# Patient Record
Sex: MALE | Race: WHITE | NOT HISPANIC OR LATINO | Employment: OTHER | URBAN - METROPOLITAN AREA
[De-identification: names, ages, dates, MRNs, and addresses within clinical notes are randomized per-mention and may not be internally consistent; named-entity substitution may affect disease eponyms.]

---

## 2019-07-23 ENCOUNTER — APPOINTMENT (OUTPATIENT)
Dept: GENERAL RADIOLOGY | Facility: HOSPITAL | Age: 34
End: 2019-07-23

## 2019-07-23 ENCOUNTER — HOSPITAL ENCOUNTER (EMERGENCY)
Facility: HOSPITAL | Age: 34
Discharge: HOME | End: 2019-07-23
Attending: EMERGENCY MEDICINE | Admitting: EMERGENCY MEDICINE

## 2019-07-23 ENCOUNTER — APPOINTMENT (OUTPATIENT)
Dept: CT IMAGING | Facility: HOSPITAL | Age: 34
End: 2019-07-23

## 2019-07-23 VITALS
SYSTOLIC BLOOD PRESSURE: 141 MMHG | RESPIRATION RATE: 20 BRPM | HEART RATE: 47 BPM | DIASTOLIC BLOOD PRESSURE: 88 MMHG | WEIGHT: 170 LBS | HEIGHT: 70 IN | OXYGEN SATURATION: 98 % | TEMPERATURE: 98.5 F | BODY MASS INDEX: 24.34 KG/M2

## 2019-07-23 DIAGNOSIS — S06.0X0A CONCUSSION WITHOUT LOSS OF CONSCIOUSNESS, INITIAL ENCOUNTER: Primary | ICD-10-CM

## 2019-07-23 DIAGNOSIS — S20.219A CONTUSION OF CHEST WALL, UNSPECIFIED LATERALITY, INITIAL ENCOUNTER: ICD-10-CM

## 2019-07-23 DIAGNOSIS — S09.90XA MINOR HEAD INJURY, INITIAL ENCOUNTER: ICD-10-CM

## 2019-07-23 PROCEDURE — 72125 CT NECK SPINE W/O DYE: CPT

## 2019-07-23 PROCEDURE — 93005 ELECTROCARDIOGRAM TRACING: CPT

## 2019-07-23 PROCEDURE — 99284 EMERGENCY DEPT VISIT MOD MDM: CPT

## 2019-07-23 PROCEDURE — 70450 CT HEAD/BRAIN W/O DYE: CPT

## 2019-07-23 PROCEDURE — 93005 ELECTROCARDIOGRAM TRACING: CPT | Performed by: EMERGENCY MEDICINE

## 2019-07-23 PROCEDURE — 71045 X-RAY EXAM CHEST 1 VIEW: CPT

## 2019-07-23 RX ORDER — HYDROCODONE BITARTRATE AND ACETAMINOPHEN 5; 325 MG/1; MG/1
1 TABLET ORAL ONCE
Status: COMPLETED | OUTPATIENT
Start: 2019-07-23 | End: 2019-07-23

## 2019-07-23 RX ADMIN — HYDROCODONE BITARTRATE AND ACETAMINOPHEN 1 TABLET: 5; 325 TABLET ORAL at 17:33

## 2019-12-23 ENCOUNTER — OFFICE VISIT (OUTPATIENT)
Dept: FAMILY MEDICINE CLINIC | Facility: CLINIC | Age: 34
End: 2019-12-23

## 2019-12-23 VITALS
DIASTOLIC BLOOD PRESSURE: 82 MMHG | WEIGHT: 183 LBS | HEART RATE: 58 BPM | HEIGHT: 70 IN | RESPIRATION RATE: 16 BRPM | SYSTOLIC BLOOD PRESSURE: 124 MMHG | BODY MASS INDEX: 26.2 KG/M2 | OXYGEN SATURATION: 96 % | TEMPERATURE: 98.2 F

## 2019-12-23 DIAGNOSIS — M76.51 PATELLAR TENDONITIS OF RIGHT KNEE: ICD-10-CM

## 2019-12-23 DIAGNOSIS — M23.8X2 LATERAL COLLATERAL LIGAMENT DEFICIENCY OF LEFT KNEE: ICD-10-CM

## 2019-12-23 DIAGNOSIS — M25.561 CHRONIC PAIN OF RIGHT KNEE: Primary | ICD-10-CM

## 2019-12-23 DIAGNOSIS — M17.2 POST-TRAUMATIC OSTEOARTHRITIS OF BOTH KNEES: ICD-10-CM

## 2019-12-23 DIAGNOSIS — G89.29 CHRONIC PAIN OF RIGHT KNEE: Primary | ICD-10-CM

## 2019-12-23 DIAGNOSIS — S89.92XA ACUTE INJURY OF LEFT KNEE CARTILAGE, INITIAL ENCOUNTER: ICD-10-CM

## 2019-12-23 PROBLEM — M25.562 ACUTE PAIN OF LEFT KNEE: Status: ACTIVE | Noted: 2019-12-23

## 2019-12-23 PROCEDURE — 99203 OFFICE O/P NEW LOW 30 MIN: CPT | Performed by: NURSE PRACTITIONER

## 2019-12-23 RX ORDER — VALACYCLOVIR HYDROCHLORIDE 500 MG/1
TABLET, FILM COATED ORAL
COMMUNITY
Start: 2019-12-15

## 2020-01-06 ENCOUNTER — HOSPITAL ENCOUNTER (OUTPATIENT)
Dept: MRI IMAGING | Facility: HOSPITAL | Age: 35
Discharge: HOME | End: 2020-01-06
Admitting: NURSE PRACTITIONER

## 2020-01-06 PROCEDURE — 73721 MRI JNT OF LWR EXTRE W/O DYE: CPT

## 2020-01-08 ENCOUNTER — TELEPHONE (OUTPATIENT)
Dept: FAMILY MEDICINE CLINIC | Facility: CLINIC | Age: 35
End: 2020-01-08

## 2020-01-08 DIAGNOSIS — M76.51 PATELLAR TENDINITIS OF RIGHT KNEE: ICD-10-CM

## 2020-01-08 DIAGNOSIS — S83.241A TEAR OF MEDIAL MENISCUS OF RIGHT KNEE, CURRENT, UNSPECIFIED TEAR TYPE, INITIAL ENCOUNTER: ICD-10-CM

## 2020-01-08 DIAGNOSIS — S83.212A BUCKET-HANDLE TEAR OF MEDIAL MENISCUS OF LEFT KNEE AS CURRENT INJURY, INITIAL ENCOUNTER: Primary | ICD-10-CM

## 2020-01-09 NOTE — TELEPHONE ENCOUNTER
Please call patient and let him know that his MRI results show that he has patellar tendinitis of his right knee and he also has medial meniscus tears in both knees. I have already sent a referral to ortho for him and they will be calling him to make an appointment.  Thanks

## 2020-01-21 ENCOUNTER — OFFICE VISIT (OUTPATIENT)
Dept: ORTHOPEDIC SURGERY | Facility: CLINIC | Age: 35
End: 2020-01-21

## 2020-01-21 VITALS — OXYGEN SATURATION: 97 % | BODY MASS INDEX: 27.93 KG/M2 | WEIGHT: 195.11 LBS | HEART RATE: 64 BPM | HEIGHT: 70 IN

## 2020-01-21 DIAGNOSIS — G89.29 CHRONIC PAIN OF BOTH KNEES: Primary | ICD-10-CM

## 2020-01-21 DIAGNOSIS — M25.562 CHRONIC PAIN OF BOTH KNEES: Primary | ICD-10-CM

## 2020-01-21 DIAGNOSIS — M17.0 PRIMARY OSTEOARTHRITIS OF BOTH KNEES: ICD-10-CM

## 2020-01-21 DIAGNOSIS — S83.241A TEAR OF MEDIAL MENISCUS OF RIGHT KNEE, CURRENT, UNSPECIFIED TEAR TYPE, INITIAL ENCOUNTER: ICD-10-CM

## 2020-01-21 DIAGNOSIS — S89.92XA INJURY OF LEFT KNEE, INITIAL ENCOUNTER: ICD-10-CM

## 2020-01-21 DIAGNOSIS — M25.561 CHRONIC PAIN OF BOTH KNEES: Primary | ICD-10-CM

## 2020-01-21 PROCEDURE — 99203 OFFICE O/P NEW LOW 30 MIN: CPT | Performed by: ORTHOPAEDIC SURGERY

## 2020-01-21 NOTE — PROGRESS NOTES
[facility] [place] [facility] Note        Subjective     Pain of the Left Knee and Pain of the Right Knee      HPI      Mac Pierre is a 34 y.o. male who presents with bilateral knee pain.  The issue has been ongoing for 7 year(s), increasing most recently after injury to (L) knee at karate. Pain is a 6/10 on the pain scale. Pain is described as dull, stabbing and shooting. Associated symptoms include pain, swelling, popping, grinding, stiffness and giving way/buckling. The pain is worse with anything for prolonged periods of time; resting improve the pain. Previous treatments have included: NSAIDS and patella tendon surgery in the right knee and arthroscopy of the left knee to address a medial meniscal tear.        Patient recently injured the left knee doing [employer] where he was kicked medially and felt a pop on the lateral side of the knee.  The knee swelled immediately.  This was about 2 weeks ago.  He is in the [employer] and scheduled to move in the next few weeks.    Past Medical History:   Diagnosis Date   • Injury of back    • Myocarditis (CMS/HCC)       Past Surgical History:   Procedure Laterality Date   • APPENDECTOMY     • MEDIAL COLLATERAL LIGAMENT REPAIR, KNEE     • SHOULDER SURGERY        Family History   Problem Relation Age of Onset   • No Known Problems Mother    • No Known Problems Father    • No Known Problems Sister      Social History     Socioeconomic History   • Marital status:      Spouse name: Not on file   • Number of children: Not on file   • Years of education: Not on file   • Highest education level: Not on file   Tobacco Use   • Smoking status: Never Smoker   • Smokeless tobacco: Never Used   Substance and Sexual Activity   • Alcohol use: Yes     Frequency: Never     Comment: RARE   • Drug use: No   • Sexual activity: Defer      Current Outpatient Medications on File Prior to Visit   Medication Sig Dispense Refill   • diclofenac (VOLTAREN) 1 % gel gel Apply 4 g  "topically to the appropriate area as directed 4 (Four) Times a Day As Needed (bilateral knee pain). 100 g 1   • valACYclovir (VALTREX) 500 MG tablet        No current facility-administered medications on file prior to visit.       No Known Allergies     The following portions of the patient's history were reviewed and updated as appropriate: allergies, current medications, past family history, past medical history, past social history, past surgical history and problem list.    Review of Systems   Constitutional: Negative.    HENT: Negative.    Eyes: Negative.    Respiratory: Negative.    Cardiovascular: Negative.    Gastrointestinal: Negative.    Endocrine: Negative.    Genitourinary: Negative.    Musculoskeletal: Positive for arthralgias.   Skin: Negative.    Allergic/Immunologic: Negative.    Neurological: Negative.    Hematological: Negative.    Psychiatric/Behavioral: Negative.             Objective      Physical Exam  Pulse 64   Ht 177.8 cm (70\")   Wt 88.5 kg (195 lb 1.7 oz)   SpO2 97%   BMI 27.99 kg/m²     Body mass index is 27.99 kg/m².    General  Mental Status - alert  General Appearance - cooperative, well groomed, not in acute distress  Orientation - Oriented X3  Build & Nutrition - well developed and well nourished  Posture - normal posture  Gait - normal gait     Integumentary  Global Assessment  Examination of related systems reveals - no lymphadenopathy  Ears:  No abnormality  Nose:  No mucous drainage  General Characteristics  Overall examination of the patient's skin reveals - no rashes, no evidence of scars, no suspicious lesions and no bruises.  Color - normal coloration of skin.  Vascular: Brisk capillary refill in all extremities    Ortho Exam  Peripheral Vascular:    Upper Extremity:   Inspection:  Left--no cyanotic nail beds Right--no cyanotic nail beds   Bilateral:  Pink nail beds with brisk capillary refill   Palpation:  Bilateral radial pulse normal    Musculoskeletal:  Global " Assessment:  Overall assessment of Lower Extremity Muscle Strength and Tone:    Right quadriceps--5/5  Right hamstrings--5/5  Right tibialis anterior--5/5  Right gastroc soleus--5/5  Right EHL--5/5    Lower Extremity:  Knee/Patella:  No digital clubbing or cyanosis.    Examination of right knee reveals:  Normal deep tendon reflexes, coordination, strength, tone, sensation.  No known fractures or deformities.    Inspection and Palpation:      Right knee:  Tenderness:  Over medial joint line  Effusion:  1+  Crepitus:  none  Pulses:  2+  Ecchymosis:  None  Warmth:  None     ROM:  Right:  Extension:0    Flexion:135  Left:  Extension:0     Flexion:135    Instability:      Right:  Lachman Test:  Negative, Varus stress test negative, Valgus stress test negative   Anterior Drawer Test:  Negative, Posterior Drawer Test:  Negative    Deformities/Malalignments/Discrepancies:    Left:  none  Right:  none    Functional Testing:  Right:  Emily's test:  Positive  Patella grind test:  Negative  Q-angle:  Normal  Apprehension Sign:  Negative     Peripheral Vascular:    Upper Extremity:   Inspection:  Left--no cyanotic nail beds Right--no cyanotic nail beds   Bilateral:  Pink nail beds with brisk capillary refill   Palpation:  Bilateral radial pulse normal    Musculoskeletal:  Global Assessment:  Overall assessment of Lower Extremity Muscle Strength and Tone:  Left quadriceps--5/5   Left hamstrings--5/5       Left tibialis anterior--5/5  Left gastroc-soleus--5/5  Left EHL--5/5      Lower Extremity:  Knee/Patella:  No digital clubbing or cyanosis.    Examination of left knee reveals:  Normal deep tendon reflexes, coordination, strength, tone, sensation.  No known fractures or deformities.    Inspection and Palpation:    Left knee:  Tenderness:  Over medial joint line  Effusion:  1+  Crepitus:  none  Pulses:  2+  Ecchymosis:  None  Warmth:  None       ROM:  Right:  Extension:0    Flexion:135  Left:  Extension:0      Flexion:135    Instability:    Left:  Lachman Test:  Negative, Varus stress test negative, Valgus stress test negative   Anterior Drawer Test:  Negative, Posterior Drawer Test:  Negative      Deformities/Malalignments/Discrepancies:    Left:  none  Right:  none    Functional Testing:    Left:  Emily's test:  Positive  Patella grind test:  Negative  Q-angle:  Normal  Apprehension Sign:  Negative        Imaging/Studies  Imaging Results (Last 24 Hours)     Procedure Component Value Units Date/Time    XR Knee 3 View Bilateral [[id number]] Resulted:  01/21/20 0937     Updated:  01/21/20 0937    Narrative:       Knee X-Ray    Indication: Pain    Study:  Upright AP, Lateral, and Sunrise views of Bilateral knee(s)    Comparison: None    Findings:  Patient appears to have minimal degenerative changes noted in the medial,   lateral, and patellofemoral compartments.   Normal soft tissues  Minimal knee effusion noted  No bony lesions  Normal alignment     Impression:   Negative knee x-ray for acute bony abnormalities        We reviewed an MRI of each knee in epic.  We have reviewed the images and report.  On the right side, interpretation is of a horizontal medial meniscal tear.  This is certainly visible but there is no clear grade 3 signal exiting into the joint.  There is thinning of the articular cartilage in the medial compartment.  On the left knee, there is no clear meniscal pathology noted.  The radiologist has said that there is a possible bucket-handle tear involving the anterior horn of the medial meniscus.  I do see a small fragment on the sagittal images but on the coronal images, there is no abnormality medial meniscus and certainly nothing consistent with a bucket-handle tear.  This is also true of the majority of the sagittal images.        MRI right knee:    IMPRESSION:  1. Minimal increased signal within the proximal patellar tendon  concerning for tendinopathy. Postsurgical changes versus  degeneration  along the inferior pole of the patella without acute fracture or  high-grade chondromalacia despite cartilage thinning and irregularity of  the patellofemoral joint space greatest inferiorly with small volume  effusion.  2. Longitudinal hyperintense signal medial meniscus posterior horn  concerning for longitudinal tearing.     D:  01/06/2020  E:  01/06/2020     This report was finalized on 1/6/2020 3:33 PM by Dr. Vinay Crane.      MRI left knee:    IMPRESSION:  Abnormal joint space narrowing of the medial compartment  without high-grade chondromalacia demonstrating gas and meniscal soft  tissue signal along the medial aspect of the anterior horn medial  meniscus concerning for bucket-handle tearing or flipped fragment with  apparent soft tissue density similar to that of meniscus on the anterior  medial margin of the ACL concerning for a flipped fragment component.  Surrounding small effusion and edema of the prepatellar fat anteriorly  within the knee is noted.      D:  01/06/2020  E:  01/06/2020     This report was finalized on 1/6/2020 3:33 PM by Dr. Vinay Crane.      Assessment    Assessment:  1. Chronic pain of both knees    2. Primary osteoarthritis of both knees    3. Tear of medial meniscus of right knee, current, unspecified tear type, initial encounter    4. Injury of left knee, initial encounter        Plan:  1. Continue over-the-counter medication as needed for discomfort  2. Right knee pain--chronic.  Small medial meniscal tear may be present peripherally.  We talked him at length about possible treatment going forward.  He has no obvious injury but he tells me he has mechanical disturbance.  We talked about the risks and benefits of arthroscopy and partial medial meniscectomy but also told him that the more meniscus that we remove, the more likely it is that he develops medial compartment arthritis.  We have agreed to wait for now.  He will revisit the issue with an orthopedist where  he moves in [place].  3. Left knee injury--at this point, I do not see anything in the left knee that would explain his lateral sided knee pain.  There is certainly no edema in the bone and no meniscal pathology laterally.  I do not agree with the radiologist interpretation that there is a bucket-handle tear of the medial meniscus.  I recommended he get an opinion of an orthopedist in [place] when he gets there and hopefully they agree with our assessment.  4. Follow-up with me YESICA James MD  01/21/20  12:59 PM